# Patient Record
Sex: FEMALE | Race: WHITE | NOT HISPANIC OR LATINO
[De-identification: names, ages, dates, MRNs, and addresses within clinical notes are randomized per-mention and may not be internally consistent; named-entity substitution may affect disease eponyms.]

---

## 2021-03-22 ENCOUNTER — APPOINTMENT (OUTPATIENT)
Dept: OTOLARYNGOLOGY | Facility: CLINIC | Age: 61
End: 2021-03-22
Payer: COMMERCIAL

## 2021-03-22 VITALS
SYSTOLIC BLOOD PRESSURE: 111 MMHG | WEIGHT: 150 LBS | HEIGHT: 68 IN | TEMPERATURE: 97.5 F | BODY MASS INDEX: 22.73 KG/M2 | DIASTOLIC BLOOD PRESSURE: 74 MMHG | HEART RATE: 59 BPM

## 2021-03-22 DIAGNOSIS — M27.0 DEVELOPMENTAL DISORDERS OF JAWS: ICD-10-CM

## 2021-03-22 PROBLEM — Z00.00 ENCOUNTER FOR PREVENTIVE HEALTH EXAMINATION: Status: ACTIVE | Noted: 2021-03-22

## 2021-03-22 PROCEDURE — 69210 REMOVE IMPACTED EAR WAX UNI: CPT

## 2021-03-22 PROCEDURE — 99203 OFFICE O/P NEW LOW 30 MIN: CPT | Mod: 25

## 2021-03-22 PROCEDURE — 99072 ADDL SUPL MATRL&STAF TM PHE: CPT

## 2021-03-22 NOTE — REVIEW OF SYSTEMS
[Ear Pain] : ear pain [Hearing Loss] : hearing loss [Ear Drainage] : ear drainage [Ear Noises] : ear noises [Negative] : Heme/Lymph [Patient Intake Form Reviewed] : Patient intake form was reviewed

## 2021-03-22 NOTE — ASSESSMENT
[FreeTextEntry1] : CIPRODEX LEFT EAR 2 DAYS\par AVOID Q TIPS\par TORUS PALATINOUS EXPLAINED\par AUDIOLOGY FOLLOW UP

## 2021-03-22 NOTE — PHYSICAL EXAM
[de-identified] : LEFT INFECTED WAX REMOVED [Normal] : mucosa is normal [Midline] : trachea located in midline position [de-identified] : TORUS PALATINOUS

## 2021-05-18 ENCOUNTER — APPOINTMENT (OUTPATIENT)
Dept: PHARMACY | Facility: CLINIC | Age: 61
End: 2021-05-18
Payer: SELF-PAY

## 2021-05-18 ENCOUNTER — TRANSCRIPTION ENCOUNTER (OUTPATIENT)
Age: 61
End: 2021-05-18

## 2021-05-18 PROCEDURE — V5299A: CUSTOM | Mod: NC

## 2021-06-04 ENCOUNTER — APPOINTMENT (OUTPATIENT)
Dept: PHARMACY | Facility: CLINIC | Age: 61
End: 2021-06-04
Payer: SELF-PAY

## 2021-06-04 PROCEDURE — V5299A: CUSTOM | Mod: NC

## 2021-06-14 ENCOUNTER — APPOINTMENT (OUTPATIENT)
Dept: OTOLARYNGOLOGY | Facility: CLINIC | Age: 61
End: 2021-06-14
Payer: COMMERCIAL

## 2021-06-14 VITALS
WEIGHT: 150 LBS | BODY MASS INDEX: 22.73 KG/M2 | SYSTOLIC BLOOD PRESSURE: 118 MMHG | TEMPERATURE: 96.4 F | HEART RATE: 66 BPM | DIASTOLIC BLOOD PRESSURE: 72 MMHG | HEIGHT: 68 IN

## 2021-06-14 PROCEDURE — 99072 ADDL SUPL MATRL&STAF TM PHE: CPT

## 2021-06-14 PROCEDURE — 99213 OFFICE O/P EST LOW 20 MIN: CPT

## 2021-06-14 RX ORDER — CIPROFLOXACIN AND DEXAMETHASONE 3; 1 MG/ML; MG/ML
0.3-0.1 SUSPENSION/ DROPS AURICULAR (OTIC)
Qty: 1 | Refills: 1 | Status: DISCONTINUED | COMMUNITY
Start: 2021-03-22 | End: 2021-06-14

## 2021-06-14 NOTE — PHYSICAL EXAM
[Normal] : mucosa is normal [Midline] : trachea located in midline position [de-identified] : wet debris in EAC AS, suctioned out

## 2021-06-14 NOTE — HISTORY OF PRESENT ILLNESS
[de-identified] : 61 yr old female w SNHL aided AU\par c/o clog AS for a few days\par +occ otalgia\par -tinnitus, dizzy\par -hx otitis, head trauma\par +FH father presby\par +noise exp (music)

## 2021-06-21 ENCOUNTER — APPOINTMENT (OUTPATIENT)
Dept: OTOLARYNGOLOGY | Facility: CLINIC | Age: 61
End: 2021-06-21
Payer: COMMERCIAL

## 2021-06-21 VITALS
BODY MASS INDEX: 22.73 KG/M2 | HEART RATE: 57 BPM | DIASTOLIC BLOOD PRESSURE: 74 MMHG | SYSTOLIC BLOOD PRESSURE: 114 MMHG | TEMPERATURE: 96.8 F | WEIGHT: 150 LBS | HEIGHT: 68 IN

## 2021-06-21 PROCEDURE — 99213 OFFICE O/P EST LOW 20 MIN: CPT

## 2021-06-21 PROCEDURE — 99072 ADDL SUPL MATRL&STAF TM PHE: CPT

## 2021-06-21 NOTE — HISTORY OF PRESENT ILLNESS
[de-identified] : 61 yr old female started on ciprodex AD for AOE AS a week ago, often forgets to take it at night.\par no otorrhea\par +SNHL aided AU, not using HA AS unless absolutely needed

## 2021-06-21 NOTE — REASON FOR VISIT
[Subsequent Evaluation] : a subsequent evaluation for [FreeTextEntry2] : Patient here to follow up on LT ear

## 2021-06-21 NOTE — REVIEW OF SYSTEMS
[As Noted in HPI] : as noted in HPI [Negative] : Head and Neck [de-identified] : started new toothpaste, tongue feels irritated

## 2021-06-21 NOTE — PHYSICAL EXAM
[de-identified] : min wet granulation tissue left TM [de-identified] : clear AD [Normal] : mucosa is normal [Midline] : trachea located in midline position

## 2021-06-29 ENCOUNTER — APPOINTMENT (OUTPATIENT)
Dept: OTOLARYNGOLOGY | Facility: CLINIC | Age: 61
End: 2021-06-29
Payer: COMMERCIAL

## 2021-06-29 ENCOUNTER — APPOINTMENT (OUTPATIENT)
Dept: PHARMACY | Facility: CLINIC | Age: 61
End: 2021-06-29
Payer: SELF-PAY

## 2021-06-29 VITALS
TEMPERATURE: 97.2 F | WEIGHT: 150 LBS | DIASTOLIC BLOOD PRESSURE: 74 MMHG | BODY MASS INDEX: 22.73 KG/M2 | SYSTOLIC BLOOD PRESSURE: 109 MMHG | HEART RATE: 61 BPM | HEIGHT: 68 IN

## 2021-06-29 PROCEDURE — 99213 OFFICE O/P EST LOW 20 MIN: CPT | Mod: 25

## 2021-06-29 PROCEDURE — V5299A: CUSTOM | Mod: NC

## 2021-06-29 PROCEDURE — 99072 ADDL SUPL MATRL&STAF TM PHE: CPT

## 2021-06-29 PROCEDURE — 92557 COMPREHENSIVE HEARING TEST: CPT

## 2021-06-29 PROCEDURE — 92567 TYMPANOMETRY: CPT

## 2021-06-29 NOTE — DATA REVIEWED
[de-identified] : - Type A tymps au\par - Mild sloping to profound snhl au \par *decrease noted in WRS au (R>L)\par Rec: 1) ENT F/u 2) re-eval as per md 3) continued use of hearing aids 4) further testing as per md

## 2021-06-29 NOTE — REVIEW OF SYSTEMS
[As Noted in HPI] : as noted in HPI [Negative] : Head and Neck [de-identified] : started new toothpaste, tongue feels irritated

## 2021-06-29 NOTE — ASSESSMENT
[FreeTextEntry1] : AOE resolved\par \par  mild sloping to profound SNHL w type A AU, decreased discrim AU since 2020\par continued use of HA\par annual audio

## 2021-06-29 NOTE — HISTORY OF PRESENT ILLNESS
[de-identified] : 61 yr old female has been on ciprodex AS for almost 2 weeks.  No more otalgia. No otorrhea.  Still feels clogged.\par +SNHL aided AU, has kept HA AS out\par flying 7/2/2021

## 2021-07-07 ENCOUNTER — APPOINTMENT (OUTPATIENT)
Dept: OTOLARYNGOLOGY | Facility: CLINIC | Age: 61
End: 2021-07-07
Payer: COMMERCIAL

## 2021-07-07 VITALS
HEART RATE: 61 BPM | DIASTOLIC BLOOD PRESSURE: 76 MMHG | HEIGHT: 68 IN | BODY MASS INDEX: 22.73 KG/M2 | WEIGHT: 150 LBS | SYSTOLIC BLOOD PRESSURE: 109 MMHG

## 2021-07-07 PROCEDURE — 99072 ADDL SUPL MATRL&STAF TM PHE: CPT

## 2021-07-07 PROCEDURE — 69210 REMOVE IMPACTED EAR WAX UNI: CPT

## 2021-07-07 PROCEDURE — 99213 OFFICE O/P EST LOW 20 MIN: CPT | Mod: 25

## 2021-07-07 NOTE — PHYSICAL EXAM
[de-identified] : SMALL AREA OF DEBRIS REMOVED/ CANAL SLIGHTLY IRRITATED [Normal] : lingual tonsils are normal [Midline] : trachea located in midline position

## 2021-07-22 ENCOUNTER — APPOINTMENT (OUTPATIENT)
Dept: OTOLARYNGOLOGY | Facility: CLINIC | Age: 61
End: 2021-07-22
Payer: COMMERCIAL

## 2021-07-22 VITALS
HEIGHT: 68 IN | HEART RATE: 57 BPM | WEIGHT: 150 LBS | DIASTOLIC BLOOD PRESSURE: 80 MMHG | BODY MASS INDEX: 22.73 KG/M2 | SYSTOLIC BLOOD PRESSURE: 120 MMHG

## 2021-07-22 PROCEDURE — 99213 OFFICE O/P EST LOW 20 MIN: CPT

## 2021-07-22 PROCEDURE — 99072 ADDL SUPL MATRL&STAF TM PHE: CPT

## 2021-07-22 RX ORDER — CIPROFLOXACIN AND DEXAMETHASONE 3; 1 MG/ML; MG/ML
0.3-0.1 SUSPENSION/ DROPS AURICULAR (OTIC)
Qty: 1 | Refills: 1 | Status: DISCONTINUED | COMMUNITY
Start: 2021-07-07 | End: 2021-07-22

## 2021-07-22 NOTE — PHYSICAL EXAM
[de-identified] : min debris AD suctioned out [Normal] : mucosa is normal [Midline] : trachea located in midline position

## 2021-07-22 NOTE — HISTORY OF PRESENT ILLNESS
[de-identified] : 61 yr old female w chronic otitis externa tx w ciprodex in the past.  Ear still doesn't feel 100%.  Has been using H2O2.\par aided AU\par daughter is getting  next week\par no upcoming flights

## 2021-07-22 NOTE — REVIEW OF SYSTEMS
[As Noted in HPI] : as noted in HPI [Negative] : Head and Neck [de-identified] : started new toothpaste, tongue feels irritated

## 2021-07-22 NOTE — REASON FOR VISIT
[Subsequent Evaluation] : a subsequent evaluation for [FreeTextEntry2] : Patient here for ear ear check up

## 2021-08-05 ENCOUNTER — APPOINTMENT (OUTPATIENT)
Dept: OTOLARYNGOLOGY | Facility: CLINIC | Age: 61
End: 2021-08-05
Payer: COMMERCIAL

## 2021-08-05 VITALS
HEIGHT: 68 IN | SYSTOLIC BLOOD PRESSURE: 125 MMHG | WEIGHT: 150 LBS | HEART RATE: 55 BPM | DIASTOLIC BLOOD PRESSURE: 77 MMHG | BODY MASS INDEX: 22.73 KG/M2

## 2021-08-05 PROCEDURE — 99213 OFFICE O/P EST LOW 20 MIN: CPT

## 2021-08-05 RX ORDER — CIPROFLOXACIN AND DEXAMETHASONE 3; 1 MG/ML; MG/ML
0.3-0.1 SUSPENSION/ DROPS AURICULAR (OTIC) TWICE DAILY
Qty: 1 | Refills: 3 | Status: DISCONTINUED | COMMUNITY
Start: 2021-06-14 | End: 2021-08-05

## 2021-08-05 RX ORDER — CIPROFLOXACIN AND DEXAMETHASONE 3; 1 MG/ML; MG/ML
0.3-0.1 SUSPENSION/ DROPS AURICULAR (OTIC)
Qty: 1 | Refills: 1 | Status: DISCONTINUED | COMMUNITY
Start: 2021-07-07 | End: 2021-08-05

## 2021-08-05 NOTE — REVIEW OF SYSTEMS
[As Noted in HPI] : as noted in HPI [Negative] : Head and Neck [de-identified] : started new toothpaste, tongue feels irritated

## 2021-08-05 NOTE — PHYSICAL EXAM
[Normal] : mucosa is normal [Midline] : trachea located in midline position [de-identified] : wet debris AU suctioned out

## 2021-08-05 NOTE — HISTORY OF PRESENT ILLNESS
[de-identified] : 61 yr old female w chronic otitis externa tx w ciprodex in the past.  Ear still feels clogged. Has been using H2O2. Got a small piece of wax out this morning\par aided AU\par

## 2021-08-12 ENCOUNTER — APPOINTMENT (OUTPATIENT)
Dept: OTOLARYNGOLOGY | Facility: CLINIC | Age: 61
End: 2021-08-12
Payer: COMMERCIAL

## 2021-08-12 VITALS
BODY MASS INDEX: 22.73 KG/M2 | HEART RATE: 65 BPM | SYSTOLIC BLOOD PRESSURE: 107 MMHG | DIASTOLIC BLOOD PRESSURE: 70 MMHG | HEIGHT: 68 IN | WEIGHT: 150 LBS

## 2021-08-12 PROCEDURE — 99213 OFFICE O/P EST LOW 20 MIN: CPT

## 2021-08-12 NOTE — HISTORY OF PRESENT ILLNESS
[de-identified] : 61 yr old female w chronic otitis externa started VosolHC 5d ago, ear feels much better.\par aided AU

## 2021-08-12 NOTE — REASON FOR VISIT
[Subsequent Evaluation] : a subsequent evaluation for [FreeTextEntry2] : Patient here to check on ears

## 2021-08-12 NOTE — REVIEW OF SYSTEMS
[As Noted in HPI] : as noted in HPI [Negative] : Head and Neck [de-identified] : started new toothpaste, tongue feels irritated

## 2021-08-17 ENCOUNTER — APPOINTMENT (OUTPATIENT)
Dept: OTOLARYNGOLOGY | Facility: CLINIC | Age: 61
End: 2021-08-17
Payer: COMMERCIAL

## 2021-08-17 VITALS
HEIGHT: 68 IN | DIASTOLIC BLOOD PRESSURE: 77 MMHG | SYSTOLIC BLOOD PRESSURE: 109 MMHG | BODY MASS INDEX: 22.73 KG/M2 | HEART RATE: 66 BPM | WEIGHT: 150 LBS

## 2021-08-17 PROCEDURE — 99213 OFFICE O/P EST LOW 20 MIN: CPT

## 2021-08-17 RX ORDER — HYDROCORTISONE AND ACETIC ACID OTIC 20.75; 10.375 MG/ML; MG/ML
1-2 SOLUTION AURICULAR (OTIC)
Qty: 1 | Refills: 2 | Status: DISCONTINUED | COMMUNITY
Start: 2021-08-05 | End: 2021-08-17

## 2021-08-17 NOTE — PHYSICAL EXAM
[Normal] : mucosa is normal [Midline] : trachea located in midline position [de-identified] : small amount of pus in EAC AD on TM

## 2021-08-24 ENCOUNTER — APPOINTMENT (OUTPATIENT)
Dept: OTOLARYNGOLOGY | Facility: CLINIC | Age: 61
End: 2021-08-24
Payer: COMMERCIAL

## 2021-08-24 VITALS
WEIGHT: 150 LBS | SYSTOLIC BLOOD PRESSURE: 110 MMHG | BODY MASS INDEX: 22.73 KG/M2 | HEART RATE: 64 BPM | HEIGHT: 68 IN | DIASTOLIC BLOOD PRESSURE: 73 MMHG

## 2021-08-24 PROCEDURE — 99213 OFFICE O/P EST LOW 20 MIN: CPT

## 2021-08-24 NOTE — PHYSICAL EXAM
[Normal] : mucosa is normal [Midline] : trachea located in midline position [de-identified] : no obvious perf.  TM erythematous and swollen w thick effusion

## 2021-08-24 NOTE — HISTORY OF PRESENT ILLNESS
[de-identified] : 61 yr old female aided AU\par +hx OE\par Started COS last week, had pain and tasted drops in her throat. Switched back to ciprodex.  Has been careful about water exposure.\par Had a lot of pain last night, heard a pop, then had otorrhea.\par On doxycycline now for a skin problem (day 7/14)

## 2021-08-24 NOTE — ASSESSMENT
[FreeTextEntry1] : H2O precautions\par keep HA out\par continue ciprodex\par rx Augmentin\par Pt to let dermatologist know that she's starting Augmentin\par \par f/u 10-14 days Statement Selected

## 2021-09-02 ENCOUNTER — APPOINTMENT (OUTPATIENT)
Dept: OTOLARYNGOLOGY | Facility: CLINIC | Age: 61
End: 2021-09-02
Payer: COMMERCIAL

## 2021-09-02 VITALS
BODY MASS INDEX: 22.73 KG/M2 | SYSTOLIC BLOOD PRESSURE: 117 MMHG | WEIGHT: 150 LBS | DIASTOLIC BLOOD PRESSURE: 74 MMHG | HEIGHT: 68 IN | HEART RATE: 64 BPM

## 2021-09-02 PROCEDURE — 99214 OFFICE O/P EST MOD 30 MIN: CPT

## 2021-09-02 NOTE — HISTORY OF PRESENT ILLNESS
[de-identified] : 61 yr old female w persistent otorrhea AD despite COS then ciprodex and Augmentin.

## 2021-09-02 NOTE — PHYSICAL EXAM
[Normal] : mucosa is normal [Midline] : trachea located in midline position [de-identified] : min mucoid fluid on TM AD [de-identified] : no obvvious perf, erythme of TM AD

## 2021-09-02 NOTE — ASSESSMENT
[FreeTextEntry1] : otorrhea persists\par continue ciprodex\par rx Biaxin, will hold statin while taking Biaxin

## 2021-09-09 ENCOUNTER — APPOINTMENT (OUTPATIENT)
Dept: OTOLARYNGOLOGY | Facility: CLINIC | Age: 61
End: 2021-09-09
Payer: COMMERCIAL

## 2021-09-09 VITALS
HEIGHT: 68 IN | SYSTOLIC BLOOD PRESSURE: 118 MMHG | HEART RATE: 57 BPM | DIASTOLIC BLOOD PRESSURE: 81 MMHG | WEIGHT: 150 LBS | BODY MASS INDEX: 22.73 KG/M2

## 2021-09-09 PROCEDURE — 99213 OFFICE O/P EST LOW 20 MIN: CPT

## 2021-09-09 RX ORDER — AMOXICILLIN AND CLAVULANATE POTASSIUM 875; 125 MG/1; MG/1
875-125 TABLET, COATED ORAL
Qty: 20 | Refills: 1 | Status: DISCONTINUED | COMMUNITY
Start: 2021-08-24 | End: 2021-09-09

## 2021-09-09 NOTE — HISTORY OF PRESENT ILLNESS
[de-identified] : 61 yr old female w persistent otorrhea AD despite COS then ciprodex and Augmentin, now on Biaxin and ciprodex.  Still feels clogged w otalgia on and off\par C&S coag neg staph\par Careful about H2O exposure, not wearing HA AD

## 2021-09-09 NOTE — PHYSICAL EXAM
[Normal] : mucosa is normal [Midline] : trachea located in midline position [de-identified] : +mucoid fluid on TM AD [de-identified] : no obvvious perf, erythme of TM AD

## 2021-09-10 ENCOUNTER — APPOINTMENT (OUTPATIENT)
Dept: OTOLARYNGOLOGY | Facility: CLINIC | Age: 61
End: 2021-09-10
Payer: COMMERCIAL

## 2021-09-10 VITALS
HEART RATE: 62 BPM | WEIGHT: 150 LBS | BODY MASS INDEX: 22.73 KG/M2 | SYSTOLIC BLOOD PRESSURE: 114 MMHG | DIASTOLIC BLOOD PRESSURE: 79 MMHG | HEIGHT: 68 IN

## 2021-09-10 PROCEDURE — 99214 OFFICE O/P EST MOD 30 MIN: CPT | Mod: 25

## 2021-09-10 PROCEDURE — 69210 REMOVE IMPACTED EAR WAX UNI: CPT

## 2021-09-10 NOTE — PHYSICAL EXAM
[Normal] : mucosa is normal [Midline] : trachea located in midline position [de-identified] : Right Drainage debris [de-identified] : Left TM Normal. Right myringitis, Right TM small perforation anterior

## 2021-09-10 NOTE — REASON FOR VISIT
[Initial Consultation] : an initial consultation for [FreeTextEntry2] : internal referral by Dr Toby Melendez for right ear infection

## 2021-09-10 NOTE — HISTORY OF PRESENT ILLNESS
[de-identified] : 61 year old female, internal referral by Dr Toby Melendez for right ear infection for the past two months\par Reports right ear is very painful with otorrhea and fullness. Initially prescribed and completed Augmentin, now on clarithromycin. Reports hx of bilateral hearing loss, wears bilateral hearing aids for the past 20+years. Reports constant bilateral tinnitus, non fluctuating in severity. Reports occasionally feeling unbalanced, but denies dizziness or vertigo. Denies CT or MRI\par PMHx: Herpes, mallory-mallory skin condition

## 2021-09-16 LAB — BACTERIA FLD CULT: NORMAL

## 2021-09-17 ENCOUNTER — RESULT REVIEW (OUTPATIENT)
Age: 61
End: 2021-09-17

## 2021-09-21 ENCOUNTER — OUTPATIENT (OUTPATIENT)
Dept: OUTPATIENT SERVICES | Facility: HOSPITAL | Age: 61
LOS: 1 days | End: 2021-09-21
Payer: COMMERCIAL

## 2021-09-21 ENCOUNTER — APPOINTMENT (OUTPATIENT)
Dept: CT IMAGING | Facility: CLINIC | Age: 61
End: 2021-09-21
Payer: COMMERCIAL

## 2021-09-21 ENCOUNTER — OUTPATIENT (OUTPATIENT)
Dept: OUTPATIENT SERVICES | Facility: HOSPITAL | Age: 61
LOS: 1 days | End: 2021-09-21

## 2021-09-21 DIAGNOSIS — H66.011 ACUTE SUPPURATIVE OTITIS MEDIA WITH SPONTANEOUS RUPTURE OF EAR DRUM, RIGHT EAR: ICD-10-CM

## 2021-09-21 DIAGNOSIS — H66.91 OTITIS MEDIA, UNSPECIFIED, RIGHT EAR: ICD-10-CM

## 2021-09-21 DIAGNOSIS — Z00.8 ENCOUNTER FOR OTHER GENERAL EXAMINATION: ICD-10-CM

## 2021-09-21 PROCEDURE — 70480 CT ORBIT/EAR/FOSSA W/O DYE: CPT

## 2021-09-21 PROCEDURE — 70480 CT ORBIT/EAR/FOSSA W/O DYE: CPT | Mod: 26

## 2021-09-22 ENCOUNTER — APPOINTMENT (OUTPATIENT)
Dept: OTOLARYNGOLOGY | Facility: CLINIC | Age: 61
End: 2021-09-22
Payer: COMMERCIAL

## 2021-09-22 VITALS
HEIGHT: 68 IN | HEART RATE: 91 BPM | DIASTOLIC BLOOD PRESSURE: 81 MMHG | SYSTOLIC BLOOD PRESSURE: 116 MMHG | WEIGHT: 150 LBS | BODY MASS INDEX: 22.73 KG/M2

## 2021-09-22 PROCEDURE — 92504 EAR MICROSCOPY EXAMINATION: CPT

## 2021-09-22 PROCEDURE — 99213 OFFICE O/P EST LOW 20 MIN: CPT | Mod: 25

## 2021-09-22 NOTE — REASON FOR VISIT
[Subsequent Evaluation] : a subsequent evaluation for [FreeTextEntry2] : follow up for ear pain and hearing loss

## 2021-10-04 ENCOUNTER — APPOINTMENT (OUTPATIENT)
Dept: OTOLARYNGOLOGY | Facility: CLINIC | Age: 61
End: 2021-10-04
Payer: COMMERCIAL

## 2021-10-04 PROCEDURE — 99213 OFFICE O/P EST LOW 20 MIN: CPT | Mod: 25

## 2021-10-04 PROCEDURE — 92504 EAR MICROSCOPY EXAMINATION: CPT

## 2021-11-04 ENCOUNTER — APPOINTMENT (OUTPATIENT)
Dept: OTOLARYNGOLOGY | Facility: CLINIC | Age: 61
End: 2021-11-04
Payer: COMMERCIAL

## 2021-11-04 VITALS
WEIGHT: 150 LBS | HEART RATE: 59 BPM | HEIGHT: 68 IN | DIASTOLIC BLOOD PRESSURE: 81 MMHG | BODY MASS INDEX: 22.73 KG/M2 | SYSTOLIC BLOOD PRESSURE: 124 MMHG

## 2021-11-04 PROCEDURE — 92504 EAR MICROSCOPY EXAMINATION: CPT

## 2021-11-04 PROCEDURE — 99214 OFFICE O/P EST MOD 30 MIN: CPT | Mod: 25

## 2021-11-04 RX ORDER — SULFAMETHOXAZOLE AND TRIMETHOPRIM 800; 160 MG/1; MG/1
800-160 TABLET ORAL
Qty: 28 | Refills: 0 | Status: COMPLETED | COMMUNITY
Start: 2021-09-10 | End: 2021-11-04

## 2021-11-04 RX ORDER — ATORVASTATIN CALCIUM 80 MG/1
TABLET, FILM COATED ORAL
Refills: 0 | Status: ACTIVE | COMMUNITY

## 2021-11-04 RX ORDER — SULFACETAMIDE SODIUM AND PREDNISOLONE SODIUM PHOSPHATE 100; 2.3 MG/ML; MG/ML
10-0.23 SOLUTION/ DROPS OPHTHALMIC
Qty: 1 | Refills: 1 | Status: COMPLETED | COMMUNITY
Start: 2021-09-10 | End: 2021-11-04

## 2021-11-04 RX ORDER — CIPROFLOXACIN HYDROCHLORIDE 500 MG/1
500 TABLET, FILM COATED ORAL
Qty: 28 | Refills: 0 | Status: COMPLETED | COMMUNITY
Start: 2021-09-10 | End: 2021-11-04

## 2021-11-04 RX ORDER — PREDNISONE 10 MG/1
10 TABLET ORAL
Qty: 30 | Refills: 0 | Status: COMPLETED | COMMUNITY
Start: 2021-09-10 | End: 2021-11-04

## 2021-11-04 RX ORDER — FAMCICLOVIR 250 MG/1
TABLET, FILM COATED ORAL
Refills: 0 | Status: ACTIVE | COMMUNITY

## 2021-11-04 RX ORDER — ACYCLOVIR 800 MG/1
TABLET ORAL
Refills: 0 | Status: COMPLETED | COMMUNITY
End: 2021-11-04

## 2021-11-04 RX ORDER — CLARITHROMYCIN 500 MG/1
500 TABLET, FILM COATED ORAL
Qty: 20 | Refills: 0 | Status: COMPLETED | COMMUNITY
Start: 2021-09-02 | End: 2021-11-04

## 2021-11-04 NOTE — HISTORY OF PRESENT ILLNESS
[de-identified] : 61 year old female follow up for right ear infection and left ear irritation.  States right clear, yellow otorrhea, otalgia, fells fulness inside right ear, intermittent piercing pain, bilateral tinnitus, hearing loss from right ear and left ear itchiness and  irritation from constantly trying to push on ear to hear better. States has not worn the right ear hearing for over a week, and takes left side hearing aid off when home due to the irritation.  Patient denies dizziness, vertigo, or  headaches related to hearing.\par

## 2021-11-04 NOTE — REASON FOR VISIT
[Subsequent Evaluation] : a subsequent evaluation for [FreeTextEntry2] : follow up for right ear infection and left ear irritation

## 2021-11-22 ENCOUNTER — APPOINTMENT (OUTPATIENT)
Dept: PHARMACY | Facility: CLINIC | Age: 61
End: 2021-11-22
Payer: SELF-PAY

## 2021-11-22 ENCOUNTER — APPOINTMENT (OUTPATIENT)
Dept: OTOLARYNGOLOGY | Facility: CLINIC | Age: 61
End: 2021-11-22
Payer: COMMERCIAL

## 2021-11-22 VITALS
SYSTOLIC BLOOD PRESSURE: 131 MMHG | HEART RATE: 56 BPM | WEIGHT: 150 LBS | HEIGHT: 68 IN | BODY MASS INDEX: 22.73 KG/M2 | DIASTOLIC BLOOD PRESSURE: 74 MMHG

## 2021-11-22 PROCEDURE — 92557 COMPREHENSIVE HEARING TEST: CPT

## 2021-11-22 PROCEDURE — V5299A: CUSTOM | Mod: NC

## 2021-11-22 PROCEDURE — 99213 OFFICE O/P EST LOW 20 MIN: CPT | Mod: 25

## 2021-11-22 PROCEDURE — 92504 EAR MICROSCOPY EXAMINATION: CPT

## 2021-11-22 PROCEDURE — 92567 TYMPANOMETRY: CPT

## 2021-11-22 NOTE — PHYSICAL EXAM
[Normal] : the left external ear was normal [de-identified] : anterior inferior perforation; 5% of ear drum

## 2021-11-22 NOTE — REASON FOR VISIT
[Subsequent Evaluation] : a subsequent evaluation for [FreeTextEntry2] : Patient here for a subsequent evaluation for ears

## 2021-11-22 NOTE — DATA REVIEWED
[de-identified] : type A tymp AS; CNS AD\par AS: mild to profound SNHL 250-8000 Hz\par AD: mod-severe rising to mild sloping to profound mixed hearing loss 250-8000 Hz\par 1) ent f/u 2) re-eval as per MD 3) continued use of hearing aids / hack

## 2021-11-24 ENCOUNTER — APPOINTMENT (OUTPATIENT)
Dept: PHARMACY | Facility: CLINIC | Age: 61
End: 2021-11-24

## 2022-01-31 ENCOUNTER — APPOINTMENT (OUTPATIENT)
Dept: OTOLARYNGOLOGY | Facility: CLINIC | Age: 62
End: 2022-01-31
Payer: COMMERCIAL

## 2022-01-31 VITALS
HEIGHT: 68 IN | HEART RATE: 66 BPM | SYSTOLIC BLOOD PRESSURE: 114 MMHG | DIASTOLIC BLOOD PRESSURE: 79 MMHG | BODY MASS INDEX: 22.73 KG/M2 | WEIGHT: 150 LBS

## 2022-01-31 PROCEDURE — 92504 EAR MICROSCOPY EXAMINATION: CPT

## 2022-01-31 PROCEDURE — 99213 OFFICE O/P EST LOW 20 MIN: CPT | Mod: 25

## 2022-07-28 ENCOUNTER — APPOINTMENT (OUTPATIENT)
Dept: OTOLARYNGOLOGY | Facility: CLINIC | Age: 62
End: 2022-07-28

## 2022-07-28 VITALS
DIASTOLIC BLOOD PRESSURE: 76 MMHG | BODY MASS INDEX: 22.73 KG/M2 | HEART RATE: 61 BPM | HEIGHT: 68 IN | WEIGHT: 150 LBS | SYSTOLIC BLOOD PRESSURE: 121 MMHG

## 2022-07-28 DIAGNOSIS — H60.90 UNSPECIFIED OTITIS EXTERNA, UNSPECIFIED EAR: ICD-10-CM

## 2022-07-28 PROCEDURE — 99213 OFFICE O/P EST LOW 20 MIN: CPT

## 2022-07-28 PROCEDURE — 92567 TYMPANOMETRY: CPT

## 2022-07-28 PROCEDURE — 92557 COMPREHENSIVE HEARING TEST: CPT

## 2022-07-28 RX ORDER — VALACYCLOVIR 500 MG/1
500 TABLET, FILM COATED ORAL
Qty: 180 | Refills: 0 | Status: ACTIVE | COMMUNITY
Start: 2021-08-18

## 2022-07-28 RX ORDER — CLINDAMYCIN HYDROCHLORIDE 300 MG/1
300 CAPSULE ORAL
Qty: 30 | Refills: 0 | Status: DISCONTINUED | COMMUNITY
Start: 2021-11-04 | End: 2022-07-28

## 2022-07-28 RX ORDER — CIPROFLOXACIN AND DEXAMETHASONE 3; 1 MG/ML; MG/ML
0.3-0.1 SUSPENSION/ DROPS AURICULAR (OTIC)
Qty: 1 | Refills: 1 | Status: ACTIVE | COMMUNITY
Start: 2022-07-28 | End: 1900-01-01

## 2022-07-28 RX ORDER — ECONAZOLE NITRATE 10 MG/G
1 CREAM TOPICAL
Qty: 15 | Refills: 0 | Status: ACTIVE | COMMUNITY
Start: 2021-08-18

## 2022-07-28 NOTE — HISTORY OF PRESENT ILLNESS
[de-identified] : RIGHT EAR FULLNESS AND DISCOMFORT FOR A DAY\par OTOLOGIC HX REVIEWED\par NO OTHER OTOLOGIC SYMPTOMS\par RIGHT TM PERF\par

## 2022-07-28 NOTE — PHYSICAL EXAM
[Normal] : mucosa is normal [Midline] : trachea located in midline position [de-identified] : CANAL SLIGHT IRRITATION [de-identified] : RIGHT ANTERIOR TM CENTRAL PERFORATION/ NO DRAINAGE

## 2022-07-28 NOTE — ASSESSMENT
[FreeTextEntry1] :  UNCHANGED\par RIGHT EAR WATER PERCAUTIONS\par CIPRODEX  2 DAYS\par F/U WITH DR MATTSON AS SCHEDULED

## 2022-07-28 NOTE — DATA REVIEWED
[de-identified] : type A tymp AS; CNS AD\par AS: mild sloping to profound SNHL 250\par AD: severe rising to mod-severe sloping to profound mixed hearing loss 250-8000 Hz\par rec: 1) ent f/u 2) re-eval in conjunction w/ medical management

## 2022-08-22 ENCOUNTER — APPOINTMENT (OUTPATIENT)
Dept: OTOLARYNGOLOGY | Facility: CLINIC | Age: 62
End: 2022-08-22

## 2022-08-22 VITALS — HEART RATE: 61 BPM | DIASTOLIC BLOOD PRESSURE: 75 MMHG | SYSTOLIC BLOOD PRESSURE: 116 MMHG

## 2022-08-22 PROCEDURE — 92504 EAR MICROSCOPY EXAMINATION: CPT

## 2022-08-22 PROCEDURE — 99213 OFFICE O/P EST LOW 20 MIN: CPT | Mod: 25

## 2022-08-22 NOTE — ASSESSMENT
[FreeTextEntry1] : Recent right ear pain with discharge following possible water exposure in the right ear.  Took Ciprodex drops with improvement.  Exam today shows dry anterior perforation involving 10% of the eardrum, appears slightly larger relative to last visit.  Left tympanic membrane intact without effusion or retraction.  Maintain dry ear precautions right ear, counseled regarding multiple silicone earplugs versus custom earplugs.  Continue bilateral hearing aid usage.  Follow-up with 2 months.  If perforation remains dry consider fat myringoplasty, discussed details in office today.

## 2022-08-25 ENCOUNTER — APPOINTMENT (OUTPATIENT)
Dept: PHARMACY | Facility: CLINIC | Age: 62
End: 2022-08-25

## 2022-08-25 PROCEDURE — V5299A: CUSTOM | Mod: NC

## 2022-12-12 ENCOUNTER — APPOINTMENT (OUTPATIENT)
Dept: OTOLARYNGOLOGY | Facility: CLINIC | Age: 62
End: 2022-12-12

## 2022-12-12 ENCOUNTER — APPOINTMENT (OUTPATIENT)
Dept: PHARMACY | Facility: CLINIC | Age: 62
End: 2022-12-12

## 2022-12-12 VITALS
WEIGHT: 150 LBS | SYSTOLIC BLOOD PRESSURE: 108 MMHG | HEART RATE: 64 BPM | DIASTOLIC BLOOD PRESSURE: 67 MMHG | HEIGHT: 68 IN | BODY MASS INDEX: 22.73 KG/M2

## 2022-12-12 PROCEDURE — V5299A: CUSTOM | Mod: NC

## 2022-12-12 PROCEDURE — 99213 OFFICE O/P EST LOW 20 MIN: CPT | Mod: 25

## 2022-12-12 PROCEDURE — 92504 EAR MICROSCOPY EXAMINATION: CPT

## 2022-12-12 NOTE — ASSESSMENT
[FreeTextEntry1] : Being careful about getting water in right ear, wearing hearing aids with benefit but feels as though she needs a programming adjustment.\par Exam today shows stable appearing anterior inferior dry perforation involving 10 to 15% of the eardrum.  Prominent anterior canal bulge.\par \par Again discussed options for management, including continued hearing aid usage versus surgical repair.  Discussed options for surgical repair including fat myringoplasty versus underlay tympanoplasty.  Patient plans to follow-up in March/April for repeat discussion after other personal obligations resolved.  In the short-term she will follow-up with audiology for programming adjustments of her hearing aids.

## 2023-03-13 ENCOUNTER — APPOINTMENT (OUTPATIENT)
Dept: PHARMACY | Facility: CLINIC | Age: 63
End: 2023-03-13
Payer: SELF-PAY

## 2023-03-13 ENCOUNTER — APPOINTMENT (OUTPATIENT)
Dept: PHARMACY | Facility: CLINIC | Age: 63
End: 2023-03-13
Payer: COMMERCIAL

## 2023-03-13 ENCOUNTER — APPOINTMENT (OUTPATIENT)
Dept: OTOLARYNGOLOGY | Facility: CLINIC | Age: 63
End: 2023-03-13
Payer: COMMERCIAL

## 2023-03-13 VITALS
HEART RATE: 56 BPM | WEIGHT: 150 LBS | SYSTOLIC BLOOD PRESSURE: 128 MMHG | BODY MASS INDEX: 22.73 KG/M2 | DIASTOLIC BLOOD PRESSURE: 83 MMHG | HEIGHT: 68 IN

## 2023-03-13 DIAGNOSIS — H60.90 UNSPECIFIED OTITIS EXTERNA, UNSPECIFIED EAR: ICD-10-CM

## 2023-03-13 PROCEDURE — 99214 OFFICE O/P EST MOD 30 MIN: CPT | Mod: 25

## 2023-03-13 PROCEDURE — V5299A: CUSTOM | Mod: NC

## 2023-03-13 PROCEDURE — 92504 EAR MICROSCOPY EXAMINATION: CPT

## 2023-03-13 PROCEDURE — V5264D: CUSTOM

## 2023-03-13 NOTE — ASSESSMENT
[FreeTextEntry1] : 63-year-old female presents for reassessment of right-sided ear drainage that she believes began after getting water in her right ear while showering.  Did not use drops or oral antibiotics, but did apply topical powder to the right ear several days ago and has had no drainage since.\par \par Otoscopic exam today shows dry inferior right tympanic membrane perforation, minimal surrounding irritation of canal and eardrum.  Left tympanic membrane intact without effusion or retraction.  Bilateral facial function normal.\par \par Recommended reapplication of topical ciprofloxacin/dexamethasone/clotrimazole/boric acid powder twice weekly for the next week, maintain dry ear precautions.  Given patient appears prone to drainage from water exposure, again discussed option of right tympanoplasty.  She is interested in proceeding.  Discussed details of procedure as well as risks and expected postoperative recovery period.  Follow-up 1 month as scheduled to reassess ear and ensure no new drainage.\par \par Discussion of tympanoplasty surgery risks, benefits, and alternatives:\par \par Risks, benefits, and alternatives of tympanoplasty were discussed with the patient.  Risks would include but are not limited to bleeding, infection, persistent pain, persistent perforation, persistent drainage, failure to improve hearing, worsened hearing including risk of profound hearing loss, recurrent serous effusion, persistent dizziness, persistent tinnitus, facial nerve injury, or taste changes.  Benefits would include closure of the perforation, reduction or elimination of drainage, possible improvement in hearing, and prevention of the need to maintain water precautions.  Alternatives would include observation or hearing aid use.  The patient agrees to proceed with surgery.\par 
PE: Normal head, atraumatic. Normal skin, no rashes/lesions/erythema.  MSK: No deformity or open wounds. No rashes or lesions. EHL/TA/GS/FHL 5/5 BLE. Sensation intact and equal BLE. Skin warm and well perfused. DP palpable BLE. Cap refill brisk. Decreased ROM to left knee secondary to pain.  Rest of PE per medical clearance.

## 2023-04-03 ENCOUNTER — APPOINTMENT (OUTPATIENT)
Dept: OTOLARYNGOLOGY | Facility: CLINIC | Age: 63
End: 2023-04-03
Payer: COMMERCIAL

## 2023-04-03 ENCOUNTER — APPOINTMENT (OUTPATIENT)
Dept: PHARMACY | Facility: CLINIC | Age: 63
End: 2023-04-03

## 2023-04-03 VITALS
DIASTOLIC BLOOD PRESSURE: 69 MMHG | SYSTOLIC BLOOD PRESSURE: 109 MMHG | WEIGHT: 150 LBS | BODY MASS INDEX: 22.73 KG/M2 | HEIGHT: 68 IN | HEART RATE: 58 BPM

## 2023-04-03 PROCEDURE — 92504 EAR MICROSCOPY EXAMINATION: CPT

## 2023-04-03 PROCEDURE — 99213 OFFICE O/P EST LOW 20 MIN: CPT | Mod: 25

## 2023-04-03 NOTE — ASSESSMENT
[FreeTextEntry1] : Reports no pain or drainage from right ear since last visit.  Recently went on a flight and had no issues.  Exam today shows stable appearing inferior perforation.  Minor mucosalization changes involving superior aspect of eardrum but no active drainage.\par \par Topical ciprofloxacin/dexamethasone/clotrimazole/boric acid powder reapplied to right ear, maintain dry ear precautions.  Okay to move forward for scheduling of right tympanoplasty as per discussion or r/b/a at last office visit.

## 2023-04-05 DIAGNOSIS — Z01.818 ENCOUNTER FOR OTHER PREPROCEDURAL EXAMINATION: ICD-10-CM

## 2023-04-12 ENCOUNTER — OUTPATIENT (OUTPATIENT)
Dept: OUTPATIENT SERVICES | Facility: HOSPITAL | Age: 63
LOS: 1 days | End: 2023-04-12

## 2023-04-12 VITALS
SYSTOLIC BLOOD PRESSURE: 120 MMHG | OXYGEN SATURATION: 98 % | HEIGHT: 68 IN | RESPIRATION RATE: 20 BRPM | HEART RATE: 68 BPM | DIASTOLIC BLOOD PRESSURE: 80 MMHG | WEIGHT: 149.91 LBS | TEMPERATURE: 98 F

## 2023-04-12 DIAGNOSIS — H66.011 ACUTE SUPPURATIVE OTITIS MEDIA WITH SPONTANEOUS RUPTURE OF EAR DRUM, RIGHT EAR: ICD-10-CM

## 2023-04-12 DIAGNOSIS — Z98.890 OTHER SPECIFIED POSTPROCEDURAL STATES: Chronic | ICD-10-CM

## 2023-04-12 DIAGNOSIS — H72.91 UNSPECIFIED PERFORATION OF TYMPANIC MEMBRANE, RIGHT EAR: ICD-10-CM

## 2023-04-12 LAB
ANION GAP SERPL CALC-SCNC: 12 MMOL/L — SIGNIFICANT CHANGE UP (ref 7–14)
BUN SERPL-MCNC: 13 MG/DL — SIGNIFICANT CHANGE UP (ref 7–23)
CALCIUM SERPL-MCNC: 9.7 MG/DL — SIGNIFICANT CHANGE UP (ref 8.4–10.5)
CHLORIDE SERPL-SCNC: 103 MMOL/L — SIGNIFICANT CHANGE UP (ref 98–107)
CO2 SERPL-SCNC: 26 MMOL/L — SIGNIFICANT CHANGE UP (ref 22–31)
CREAT SERPL-MCNC: 0.92 MG/DL — SIGNIFICANT CHANGE UP (ref 0.5–1.3)
EGFR: 70 ML/MIN/1.73M2 — SIGNIFICANT CHANGE UP
GLUCOSE SERPL-MCNC: 69 MG/DL — LOW (ref 70–99)
HCT VFR BLD CALC: 41.5 % — SIGNIFICANT CHANGE UP (ref 34.5–45)
HGB BLD-MCNC: 13.2 G/DL — SIGNIFICANT CHANGE UP (ref 11.5–15.5)
MCHC RBC-ENTMCNC: 29.5 PG — SIGNIFICANT CHANGE UP (ref 27–34)
MCHC RBC-ENTMCNC: 31.8 GM/DL — LOW (ref 32–36)
MCV RBC AUTO: 92.6 FL — SIGNIFICANT CHANGE UP (ref 80–100)
NRBC # BLD: 0 /100 WBCS — SIGNIFICANT CHANGE UP (ref 0–0)
NRBC # FLD: 0 K/UL — SIGNIFICANT CHANGE UP (ref 0–0)
PLATELET # BLD AUTO: 181 K/UL — SIGNIFICANT CHANGE UP (ref 150–400)
POTASSIUM SERPL-MCNC: 4 MMOL/L — SIGNIFICANT CHANGE UP (ref 3.5–5.3)
POTASSIUM SERPL-SCNC: 4 MMOL/L — SIGNIFICANT CHANGE UP (ref 3.5–5.3)
RBC # BLD: 4.48 M/UL — SIGNIFICANT CHANGE UP (ref 3.8–5.2)
RBC # FLD: 12.5 % — SIGNIFICANT CHANGE UP (ref 10.3–14.5)
SODIUM SERPL-SCNC: 141 MMOL/L — SIGNIFICANT CHANGE UP (ref 135–145)
WBC # BLD: 4.67 K/UL — SIGNIFICANT CHANGE UP (ref 3.8–10.5)
WBC # FLD AUTO: 4.67 K/UL — SIGNIFICANT CHANGE UP (ref 3.8–10.5)

## 2023-04-12 NOTE — H&P PST ADULT - HISTORY OF PRESENT ILLNESS
Patient is preop dx of perforated of tympano membrane unspecified ear. Patient is scheduled for right tympanoplasty fat/fascia graft, Patient is 63 year female presents with preop dx of perforated of tympanic membrane unspecified ear. Patient is scheduled for right tympanoplasty fat/fascia graft,

## 2023-04-12 NOTE — H&P PST ADULT - FUNCTIONAL STATUS
patient does yoga on daily basis/4-10 METS patient has very active lifestyle does yoga on daily basis and play pickle ball daily/greater than 10 METS

## 2023-04-12 NOTE — H&P PST ADULT - PROBLEM SELECTOR PLAN 1
Patient tentatively scheduled for right tympanoplasty fat/fascia graft , microscope use on 4/25/23    Pre-op instructions provided. Pt given verbal and written instructions with teach back on Pepcid. Pt verbalized understanding with return demonstration.    Pt has a scheduled preop COVID test. Patient tentatively scheduled for right tympanoplasty fat/fascia graft , microscope use on 4/25/23    Pre-op instructions provided. Pt given verbal and written instructions with teach back on Pepcid. Pt verbalized understanding with return demonstration.

## 2023-04-14 ENCOUNTER — APPOINTMENT (OUTPATIENT)
Dept: PHARMACY | Facility: CLINIC | Age: 63
End: 2023-04-14
Payer: SELF-PAY

## 2023-04-14 PROBLEM — M26.03 MANDIBULAR HYPERPLASIA: Chronic | Status: ACTIVE | Noted: 2023-04-12

## 2023-04-14 PROBLEM — E78.5 HYPERLIPIDEMIA, UNSPECIFIED: Chronic | Status: ACTIVE | Noted: 2023-04-12

## 2023-04-14 PROBLEM — Q82.8 OTHER SPECIFIED CONGENITAL MALFORMATIONS OF SKIN: Chronic | Status: ACTIVE | Noted: 2023-04-12

## 2023-04-14 PROCEDURE — V5299A: CUSTOM | Mod: NC

## 2023-04-18 ENCOUNTER — TRANSCRIPTION ENCOUNTER (OUTPATIENT)
Age: 63
End: 2023-04-18

## 2023-04-21 ENCOUNTER — APPOINTMENT (OUTPATIENT)
Dept: PHARMACY | Facility: CLINIC | Age: 63
End: 2023-04-21

## 2023-04-24 ENCOUNTER — TRANSCRIPTION ENCOUNTER (OUTPATIENT)
Age: 63
End: 2023-04-24

## 2023-04-25 ENCOUNTER — OUTPATIENT (OUTPATIENT)
Dept: OUTPATIENT SERVICES | Facility: HOSPITAL | Age: 63
LOS: 1 days | Discharge: HOME | End: 2023-04-25
Payer: COMMERCIAL

## 2023-04-25 ENCOUNTER — APPOINTMENT (OUTPATIENT)
Dept: OTOLARYNGOLOGY | Facility: AMBULATORY SURGERY CENTER | Age: 63
End: 2023-04-25

## 2023-04-25 ENCOUNTER — TRANSCRIPTION ENCOUNTER (OUTPATIENT)
Age: 63
End: 2023-04-25

## 2023-04-25 VITALS
WEIGHT: 149.91 LBS | DIASTOLIC BLOOD PRESSURE: 75 MMHG | SYSTOLIC BLOOD PRESSURE: 117 MMHG | HEART RATE: 57 BPM | HEIGHT: 68 IN | RESPIRATION RATE: 20 BRPM | TEMPERATURE: 97 F | OXYGEN SATURATION: 100 %

## 2023-04-25 VITALS
RESPIRATION RATE: 14 BRPM | OXYGEN SATURATION: 100 % | HEART RATE: 66 BPM | DIASTOLIC BLOOD PRESSURE: 71 MMHG | TEMPERATURE: 98 F | SYSTOLIC BLOOD PRESSURE: 112 MMHG

## 2023-04-25 DIAGNOSIS — H66.011 ACUTE SUPPURATIVE OTITIS MEDIA WITH SPONTANEOUS RUPTURE OF EAR DRUM, RIGHT EAR: ICD-10-CM

## 2023-04-25 DIAGNOSIS — Z98.890 OTHER SPECIFIED POSTPROCEDURAL STATES: Chronic | ICD-10-CM

## 2023-04-25 PROCEDURE — 92516 FACIAL NERVE FUNCTION TEST: CPT

## 2023-04-25 PROCEDURE — 15770 DERMA-FAT-FASCIA GRAFT: CPT

## 2023-04-25 PROCEDURE — 69631 REPAIR EARDRUM STRUCTURES: CPT

## 2023-04-25 RX ORDER — ATORVASTATIN CALCIUM 80 MG/1
1 TABLET, FILM COATED ORAL
Refills: 0 | DISCHARGE

## 2023-04-25 RX ORDER — OXYCODONE HYDROCHLORIDE 5 MG/1
1 TABLET ORAL
Qty: 8 | Refills: 0
Start: 2023-04-25

## 2023-04-25 RX ORDER — VALACYCLOVIR 500 MG/1
1 TABLET, FILM COATED ORAL
Refills: 0 | DISCHARGE

## 2023-04-25 RX ORDER — AMOXICILLIN 250 MG/5ML
1 SUSPENSION, RECONSTITUTED, ORAL (ML) ORAL
Qty: 14 | Refills: 0
Start: 2023-04-25 | End: 2023-05-01

## 2023-04-25 NOTE — ASU DISCHARGE PLAN (ADULT/PEDIATRIC) - NURSING INSTRUCTIONS
Progress to regular diet as tolerated.  Keep well hydrated.   Next dose of Tylenol may be taken at 8pm

## 2023-04-25 NOTE — ASU PREOP CHECKLIST - LATEX ALLERGY
[facility]  Administration of Anesthesia  IJessie agree to be cared for by a physician anesthesiologist alone and/or with a nurse anesthetist, who is specially trained to monitor me and give me medicine to put me to sleep or keep me comfortable during my procedure    I understand that my anesthesiologist and/or anesthetist is not an employee or agent of [facility] or [employer]. He or she works for [facility]    As the patient asking for anesthesia services, I agree to:  Allow the anesthesiologist (anesthesia doctor) to give me medicine and do additional procedures as necessary. Some examples are: Starting or using an “IV” to give me medicine, fluids or blood during my procedure, and having a breathing tube placed to help me breathe when I’m asleep (intubation). In the event that my heart stops working properly, I understand that my anesthesiologist will make every effort to sustain my life, unless otherwise directed by [facility] Do Not Resuscitate documents.  Tell my anesthesia doctor before my procedure:  If I am pregnant.  The last time that I ate or drank.  iii. All of the medicines I take (including prescriptions, herbal supplements, and pills I can buy without a prescription (including street drugs/illegal medications). Failure to inform my anesthesiologist about these medicines may increase my risk of anesthetic complications.  iv.If I am allergic to anything or have had a reaction to anesthesia before.  I understand how the anesthesia medicine will help me (benefits).  I understand that with any type of anesthesia medicine there are risks:  The most common risks are: nausea, vomiting, sore throat, muscle soreness, damage to my eyes, mouth, or teeth (from breathing tube placement).  Rare risks include: remembering what happened during my procedure, allergic reactions to medications, injury to my airway, heart, lungs, vision, nerves, or  muscles and in extremely rare instances death.  My doctor has explained to me other choices available to me for my care (alternatives).  Pregnant Patients (“epidural”):  I understand that the risks of having an epidural (medicine given into my back to help control pain during labor), include itching, low blood pressure, difficulty urinating, headache or slowing of the baby’s heart. Very rare risks include infection, bleeding, seizure, irregular heart rhythms and nerve injury.  Regional Anesthesia (“spinal”, “epidural”, & “nerve blocks”):  I understand that rare but potential complications include headache, bleeding, infection, seizure, irregular heart rhythms, and nerve injury.    _____________________________________________________________________________  Patient (or Representative) Signature/Relationship to Patient  Date   Time    _____________________________________________________________________________   Name (if used)    Language/Organization   Time    _____________________________________________________________________________  Nurse Anesthetist Signature     Date   Time  _____________________________________________________________________________  Anesthesiologist Signature     Date   Time  I have discussed the procedure and information above with the patient (or patient’s representative) and answered their questions. The patient or their representative has agreed to have anesthesia services.    _____________________________________________________________________________  Witness        Date   Time  I have verified that the signature is that of the patient or patient’s representative, and that it was signed before the procedure  Patient Name: Jessie Beauchamp     : 3/20/1979                 Printed: 2025 at 12:46 PM    Medical Record #: [id number]                                            Page 1 of 1  ----------ANESTHESIA CONSENT----------     no

## 2023-04-25 NOTE — ASU PREOP CHECKLIST - ALLERGIES REVIEWED
Beulah Hudson RN          done

## 2023-04-25 NOTE — ASU DISCHARGE PLAN (ADULT/PEDIATRIC) - NS MD DC FALL RISK RISK
For information on Fall & Injury Prevention, visit: [email or web address] OR  [email or web address] OR  https://www.cdc.gov/steadi/patient.html

## 2023-04-25 NOTE — BRIEF OPERATIVE NOTE - OPERATION/FINDINGS
Postauricular approach to R tympanoplasty for anterior inferior perforation. True temporalis fascia graft. Anterior canalplasty

## 2023-05-15 ENCOUNTER — APPOINTMENT (OUTPATIENT)
Dept: OTOLARYNGOLOGY | Facility: CLINIC | Age: 63
End: 2023-05-15
Payer: COMMERCIAL

## 2023-05-15 VITALS — HEIGHT: 68 IN | BODY MASS INDEX: 22.73 KG/M2 | WEIGHT: 150 LBS

## 2023-05-15 VITALS — SYSTOLIC BLOOD PRESSURE: 122 MMHG | DIASTOLIC BLOOD PRESSURE: 80 MMHG | HEART RATE: 60 BPM

## 2023-05-15 DIAGNOSIS — H66.011 ACUTE SUPPURATIVE OTITIS MEDIA WITH SPONTANEOUS RUPTURE OF EAR DRUM, RIGHT EAR: ICD-10-CM

## 2023-05-15 DIAGNOSIS — H60.502 UNSPECIFIED ACUTE NONINFECTIVE OTITIS EXTERNA, LEFT EAR: ICD-10-CM

## 2023-05-15 DIAGNOSIS — Z98.890 OTHER SPECIFIED POSTPROCEDURAL STATES: ICD-10-CM

## 2023-05-15 DIAGNOSIS — H61.20 IMPACTED CERUMEN, UNSPECIFIED EAR: ICD-10-CM

## 2023-05-15 PROCEDURE — 99024 POSTOP FOLLOW-UP VISIT: CPT

## 2023-05-15 RX ORDER — OFLOXACIN OTIC 3 MG/ML
0.3 SOLUTION AURICULAR (OTIC) TWICE DAILY
Qty: 1 | Refills: 1 | Status: ACTIVE | COMMUNITY
Start: 2023-05-15 | End: 1900-01-01

## 2023-05-15 NOTE — HISTORY OF PRESENT ILLNESS
[de-identified] : 64 y/o F presents for first post operative visit\par s/p right tympanoplasty 4/25/23\par reports that she is doing well since surgery. occasional cracking/popping sounds. denies any post operative complications. denies new issues with pain or drainage.

## 2023-05-15 NOTE — ASSESSMENT
[FreeTextEntry1] : Exam today shows intact right tympanic membrane reconstruction after packing removal, postauricular incision well-healed, bilateral facial function normal.  She is able to hear a tuning fork in the right ear, with AC>BC.  Continue drops for another week, maintain dry ear precautions, follow-up 1 month.

## 2023-05-15 NOTE — PHYSICAL EXAM
[Binocular Microscopic Exam] : Binocular microscopic exam was performed [Normal] : the left mastoid was normal [FreeTextEntry6] : post auricular incision healing; minor swelling [FreeTextEntry8] : packing in canal [de-identified] : anterior canal wall healing appropriately [de-identified] : Facial nerve function is normal

## 2023-06-26 ENCOUNTER — APPOINTMENT (OUTPATIENT)
Dept: OTOLARYNGOLOGY | Facility: CLINIC | Age: 63
End: 2023-06-26

## 2023-07-10 ENCOUNTER — APPOINTMENT (OUTPATIENT)
Dept: OTOLARYNGOLOGY | Facility: CLINIC | Age: 63
End: 2023-07-10
Payer: COMMERCIAL

## 2023-07-10 VITALS
HEIGHT: 68 IN | HEART RATE: 61 BPM | BODY MASS INDEX: 22.73 KG/M2 | WEIGHT: 150 LBS | SYSTOLIC BLOOD PRESSURE: 106 MMHG | DIASTOLIC BLOOD PRESSURE: 72 MMHG

## 2023-07-10 DIAGNOSIS — H72.90 UNSPECIFIED PERFORATION OF TYMPANIC MEMBRANE, UNSPECIFIED EAR: ICD-10-CM

## 2023-07-10 PROCEDURE — 99024 POSTOP FOLLOW-UP VISIT: CPT

## 2023-07-10 NOTE — PHYSICAL EXAM
[Binocular Microscopic Exam] : Binocular microscopic exam was performed [Normal] : the left external ear was normal [Rinne Test Air Conduction Persists > Bone Conduction Right] : air conduction greater than bone conduction on the right [de-identified] : area of perforation appears healed

## 2023-07-10 NOTE — ASSESSMENT
[FreeTextEntry1] : Otoscopic exam today shows intact right tympanic membrane reconstruction, unable to visualize whether effusion is present due to TM thickening.  Scant drainage in inferior right ear canal which was suctioned.  Sykes does not lateralize, Rinne test shows air conduction greater than bone conduction right ear.  Postauricular incision well-healed.  Bilateral facial nerve function normal.\par \par Topical ciprofloxacin/dexamethasone/clotrimazole/boric acid powder applied to right ear canal today, maintain dry ear precautions.  Reapply powder within the next week.  Follow-up 2 months with postoperative audiogram.  Discussed potential etiologies for clogged ear sensation, including recurrent ETD versus minor discharge noticed in ear canal today.

## 2023-07-14 ENCOUNTER — APPOINTMENT (OUTPATIENT)
Dept: PHARMACY | Facility: CLINIC | Age: 63
End: 2023-07-14

## 2023-10-09 ENCOUNTER — APPOINTMENT (OUTPATIENT)
Dept: PHARMACY | Facility: CLINIC | Age: 63
End: 2023-10-09
Payer: SELF-PAY

## 2023-10-09 ENCOUNTER — APPOINTMENT (OUTPATIENT)
Dept: OTOLARYNGOLOGY | Facility: CLINIC | Age: 63
End: 2023-10-09
Payer: COMMERCIAL

## 2023-10-09 VITALS
DIASTOLIC BLOOD PRESSURE: 71 MMHG | WEIGHT: 150 LBS | HEIGHT: 68 IN | BODY MASS INDEX: 22.73 KG/M2 | SYSTOLIC BLOOD PRESSURE: 109 MMHG | HEART RATE: 59 BPM

## 2023-10-09 DIAGNOSIS — H66.91 OTITIS MEDIA, UNSPECIFIED, RIGHT EAR: ICD-10-CM

## 2023-10-09 DIAGNOSIS — H90.3 SENSORINEURAL HEARING LOSS, BILATERAL: ICD-10-CM

## 2023-10-09 DIAGNOSIS — H60.501 UNSPECIFIED ACUTE NONINFECTIVE OTITIS EXTERNA, RIGHT EAR: ICD-10-CM

## 2023-10-09 PROCEDURE — 99213 OFFICE O/P EST LOW 20 MIN: CPT

## 2023-10-09 PROCEDURE — 92557 COMPREHENSIVE HEARING TEST: CPT

## 2023-10-09 PROCEDURE — V5299A: CUSTOM

## 2023-10-09 PROCEDURE — 92567 TYMPANOMETRY: CPT

## 2023-12-07 ENCOUNTER — APPOINTMENT (OUTPATIENT)
Dept: OTOLARYNGOLOGY | Facility: CLINIC | Age: 63
End: 2023-12-07
Payer: COMMERCIAL

## 2023-12-07 VITALS
HEART RATE: 55 BPM | BODY MASS INDEX: 22.73 KG/M2 | SYSTOLIC BLOOD PRESSURE: 111 MMHG | HEIGHT: 68 IN | WEIGHT: 150 LBS | DIASTOLIC BLOOD PRESSURE: 73 MMHG

## 2023-12-07 DIAGNOSIS — Z78.9 OTHER SPECIFIED HEALTH STATUS: ICD-10-CM

## 2023-12-07 DIAGNOSIS — Z82.2 FAMILY HISTORY OF DEAFNESS AND HEARING LOSS: ICD-10-CM

## 2023-12-07 DIAGNOSIS — Z87.891 PERSONAL HISTORY OF NICOTINE DEPENDENCE: ICD-10-CM

## 2023-12-07 DIAGNOSIS — H91.10 PRESBYCUSIS, UNSPECIFIED EAR: ICD-10-CM

## 2023-12-07 PROCEDURE — 99213 OFFICE O/P EST LOW 20 MIN: CPT

## 2024-06-04 ENCOUNTER — TRANSCRIPTION ENCOUNTER (OUTPATIENT)
Age: 64
End: 2024-06-04

## 2024-07-29 ENCOUNTER — APPOINTMENT (OUTPATIENT)
Dept: OTOLARYNGOLOGY | Facility: CLINIC | Age: 64
End: 2024-07-29
Payer: COMMERCIAL

## 2024-07-29 VITALS
HEIGHT: 68 IN | WEIGHT: 145.38 LBS | DIASTOLIC BLOOD PRESSURE: 76 MMHG | HEART RATE: 69 BPM | SYSTOLIC BLOOD PRESSURE: 119 MMHG | BODY MASS INDEX: 22.03 KG/M2

## 2024-07-29 DIAGNOSIS — H66.011 ACUTE SUPPURATIVE OTITIS MEDIA WITH SPONTANEOUS RUPTURE OF EAR DRUM, RIGHT EAR: ICD-10-CM

## 2024-07-29 PROCEDURE — 99213 OFFICE O/P EST LOW 20 MIN: CPT

## 2024-07-29 RX ORDER — AMOXICILLIN AND CLAVULANATE POTASSIUM 875; 125 MG/1; MG/1
875-125 TABLET, COATED ORAL
Qty: 20 | Refills: 0 | Status: ACTIVE | COMMUNITY
Start: 2024-07-29 | End: 1900-01-01

## 2024-07-29 RX ORDER — CIPROFLOXACIN AND DEXAMETHASONE 3; 1 MG/ML; MG/ML
0.3-0.1 SUSPENSION/ DROPS AURICULAR (OTIC)
Qty: 1 | Refills: 2 | Status: ACTIVE | COMMUNITY
Start: 2024-07-29 | End: 1900-01-01

## 2024-07-29 NOTE — PHYSICAL EXAM
[de-identified] : min wet [de-identified] : AS clear, AD erythema with fluid, no obvious perf [Normal] : mucosa is normal [Midline] : trachea located in midline position

## 2024-07-29 NOTE — HISTORY OF PRESENT ILLNESS
[de-identified] : 64 yr old female s/p tympanoplasty 4/25/23 Dr Placido do successful closure of perf c/o hearing loss and drainage AD 7/27 denies preceding URI -dizzy  moving to [facility] in Sept 2024

## 2024-08-12 ENCOUNTER — NON-APPOINTMENT (OUTPATIENT)
Age: 64
End: 2024-08-12

## 2024-08-13 ENCOUNTER — APPOINTMENT (OUTPATIENT)
Dept: OTOLARYNGOLOGY | Facility: CLINIC | Age: 64
End: 2024-08-13
Payer: COMMERCIAL

## 2024-08-13 VITALS
HEIGHT: 68 IN | DIASTOLIC BLOOD PRESSURE: 78 MMHG | HEART RATE: 66 BPM | WEIGHT: 145 LBS | BODY MASS INDEX: 21.98 KG/M2 | SYSTOLIC BLOOD PRESSURE: 124 MMHG

## 2024-08-13 DIAGNOSIS — H66.011 ACUTE SUPPURATIVE OTITIS MEDIA WITH SPONTANEOUS RUPTURE OF EAR DRUM, RIGHT EAR: ICD-10-CM

## 2024-08-13 DIAGNOSIS — H90.3 SENSORINEURAL HEARING LOSS, BILATERAL: ICD-10-CM

## 2024-08-13 PROCEDURE — 92567 TYMPANOMETRY: CPT

## 2024-08-13 PROCEDURE — 99213 OFFICE O/P EST LOW 20 MIN: CPT

## 2024-08-13 PROCEDURE — 92557 COMPREHENSIVE HEARING TEST: CPT

## 2024-08-13 NOTE — PHYSICAL EXAM
[Normal] : mucosa is normal [Midline] : trachea located in midline position [de-identified] : clear AS, scarred and  intact AD

## 2024-08-13 NOTE — HISTORY OF PRESENT ILLNESS
[de-identified] : 64 yr old female s/p tympanoplasty 4/25/23 Dr Placido do successful closure of perf c/o hearing loss and drainage AD 7/27 denies preceding URI -dizzy  tx 7/29 with Augmentin and ciprodex no more otorrhea, hearing has improved  moving to [place] in Sept 2024

## 2024-08-13 NOTE — ASSESSMENT
[FreeTextEntry1] : ASOM resolved    AS mild sloping to profound SNHL w type As, AS mod to profound SNHL w type As continued use of HA annual audio  moving [place] in Sept

## (undated) DEVICE — POSITIONER FOAM EGG CRATE ULNAR 2PCS (PINK)

## (undated) DEVICE — DRAPE MICROSCOPE ZEISS W CLEARLENS

## (undated) DEVICE — LABELS BLANK W PEN

## (undated) DEVICE — PACK MASTOID/MIDDLE EAR

## (undated) DEVICE — SYR LUER LOK 20CC

## (undated) DEVICE — COTTONBALL LG

## (undated) DEVICE — DRSG PELLET

## (undated) DEVICE — PREP BETADINE KIT

## (undated) DEVICE — CONN FEMALE LUER ADAPTOR SM XMAS TREE

## (undated) DEVICE — WARMING BLANKET FULL ADULT

## (undated) DEVICE — TUBING IRRIGATION HF NAVIO

## (undated) DEVICE — CATH IV SAFE INSYTE 14G X 1.75" (ORANGE)

## (undated) DEVICE — DRAIN PENROSE .5" X 18" LATEX

## (undated) DEVICE — ELCTR MONOPOLAR STIMULATOR PROBE FLUSH-TIP

## (undated) DEVICE — CLIPPER BLADE GENERAL USE

## (undated) DEVICE — DRAPE 3/4 SHEET 52X76"

## (undated) DEVICE — BLADE TYMPANOPLASTY 2.5MM W 60 DEGREE BEVEL DOWN

## (undated) DEVICE — DRSG STERISTRIPS 0.5 X 4"

## (undated) DEVICE — FOLEY CATH 2-WAY 22FR 5CC UNCOATED SILICONE

## (undated) DEVICE — BIPOLAR FORCEP KIRWAN JEWELERS STR 4" X 0.4MM W 12FT CORD (GREEN)

## (undated) DEVICE — DRILL BIT ANSPACH FLUTED ACORN 5MMX25.4MM

## (undated) DEVICE — ELCTR GROUNDING PAD ADULT COVIDIEN

## (undated) DEVICE — SUT PLAIN GUT FAST ABSORBING 5-0 PC-1

## (undated) DEVICE — POSITIONER PATIENT SAFETY STRAP 3X60"

## (undated) DEVICE — ELCTR NDL SUBDERMAL 2 CHANNEL

## (undated) DEVICE — DRILL BIT ANSPACH DIAMOND BALL 4MM X 25.4MM

## (undated) DEVICE — DRAPE SPLIT SHEET 77" X 120"

## (undated) DEVICE — GLV 7.5 PROTEXIS (WHITE)

## (undated) DEVICE — DRAPE SURGICAL #1010

## (undated) DEVICE — CLIP IRRIGATIION FOR QD8/QD5S ANGLE ATTACHMENT

## (undated) DEVICE — DRSG TEGADERM 6"X8"

## (undated) DEVICE — BUR ANSPACH BALL DIAMOND COARSE 3MM

## (undated) DEVICE — DRAPE INSTRUMENT POUCH 6.75" X 11"

## (undated) DEVICE — DRILL BIT ANSPACH DIAMOND BALL 1.5MMX5CM

## (undated) DEVICE — ELCTR ROCKER SWITCH PENCIL BLUE 10FT

## (undated) DEVICE — GLV 7.5 PROTEXIS (BLUE)

## (undated) DEVICE — DRAPE MAYO STAND 30"

## (undated) DEVICE — DRAPE MICROSCOPE OPMI VISIONGUARD 48X118"

## (undated) DEVICE — BAG DECANTER IV STERILE

## (undated) DEVICE — VENODYNE/SCD SLEEVE CALF MEDIUM

## (undated) DEVICE — TRAY IRRIGATION SYR BULB 60CC

## (undated) DEVICE — SUT MONOCRYL 4-0 18" P-3 UNDYED

## (undated) DEVICE — ELCTR BOVIE TIP BLADE INSULATED 4" EDGE

## (undated) DEVICE — DRSG TELFA 3 X 8

## (undated) DEVICE — DRILL BIT ANSPACH FLUTED BALL 4MM X 5CM

## (undated) DEVICE — DRILL BIT ANSPACH DIAMOND BALL 2MMX5CM

## (undated) DEVICE — STAPLER SKIN PROXIMATE

## (undated) DEVICE — DRSG GLASSOCK ADULT

## (undated) DEVICE — BEAVER BLADE MINI SHARP ALL ROUND (BLUE)

## (undated) DEVICE — STRYKER 4-PORT MANIFOLD W/SPECIMEN COLLECTION

## (undated) DEVICE — SYR LUER LOK 5CC

## (undated) DEVICE — DRSG KIT EAR GLASSCK PEDS

## (undated) DEVICE — SUT VICRYL 4-0 18" RB-1 UNDYED (POP-OFF)

## (undated) DEVICE — BUR ANSPACH BALL FLUTED EXT 3MM

## (undated) DEVICE — MARKING PEN W RULER

## (undated) DEVICE — SUT VICRYL 3-0 18" RB-1 (POP-OFF)

## (undated) DEVICE — POSITIONER FOAM HEAD DONUT 9" (PINK)

## (undated) DEVICE — DRAPE TOWEL BLUE 17" X 24"

## (undated) DEVICE — SOL IRR POUR H2O 500ML

## (undated) DEVICE — SYR LUER LOK 1CC

## (undated) DEVICE — DRSG MASTISOL